# Patient Record
Sex: MALE | Race: WHITE | NOT HISPANIC OR LATINO | Employment: OTHER | URBAN - NONMETROPOLITAN AREA
[De-identification: names, ages, dates, MRNs, and addresses within clinical notes are randomized per-mention and may not be internally consistent; named-entity substitution may affect disease eponyms.]

---

## 2019-10-15 ENCOUNTER — TRANSCRIBE ORDERS (OUTPATIENT)
Dept: LAB | Facility: HOSPITAL | Age: 74
End: 2019-10-15

## 2019-10-15 ENCOUNTER — HOSPITAL ENCOUNTER (OUTPATIENT)
Dept: GENERAL RADIOLOGY | Facility: HOSPITAL | Age: 74
Discharge: HOME | End: 2019-10-15
Admitting: NURSE PRACTITIONER

## 2019-10-15 DIAGNOSIS — R05.9 COUGH: ICD-10-CM

## 2019-10-15 DIAGNOSIS — R05.9 COUGH: Primary | ICD-10-CM

## 2019-10-15 PROCEDURE — 71046 X-RAY EXAM CHEST 2 VIEWS: CPT

## 2019-10-15 PROCEDURE — 71046 X-RAY EXAM CHEST 2 VIEWS: CPT | Performed by: RADIOLOGY

## 2021-01-28 ENCOUNTER — TRANSCRIBE ORDERS (OUTPATIENT)
Dept: ADMINISTRATIVE | Facility: HOSPITAL | Age: 76
End: 2021-01-28

## 2021-01-28 DIAGNOSIS — Z01.818 PRE-OPERATIVE CLEARANCE: Primary | ICD-10-CM

## 2021-02-01 ENCOUNTER — LAB (OUTPATIENT)
Dept: LAB | Facility: HOSPITAL | Age: 76
End: 2021-02-01

## 2021-02-01 DIAGNOSIS — Z01.818 PRE-OPERATIVE CLEARANCE: ICD-10-CM

## 2021-02-01 LAB — SARS-COV-2 RNA RESP QL NAA+PROBE: NOT DETECTED

## 2021-02-01 PROCEDURE — U0004 COV-19 TEST NON-CDC HGH THRU: HCPCS

## 2021-02-01 PROCEDURE — C9803 HOPD COVID-19 SPEC COLLECT: HCPCS

## 2021-03-08 ENCOUNTER — IMMUNIZATION (OUTPATIENT)
Dept: VACCINE CLINIC | Facility: HOSPITAL | Age: 76
End: 2021-03-08

## 2021-03-08 PROCEDURE — 91300 HC SARSCOV02 VAC 30MCG/0.3ML IM: CPT | Performed by: INTERNAL MEDICINE

## 2021-03-08 PROCEDURE — 0001A: CPT | Performed by: INTERNAL MEDICINE

## 2021-03-29 ENCOUNTER — IMMUNIZATION (OUTPATIENT)
Dept: VACCINE CLINIC | Facility: HOSPITAL | Age: 76
End: 2021-03-29

## 2021-03-29 PROCEDURE — 91300 HC SARSCOV02 VAC 30MCG/0.3ML IM: CPT | Performed by: INTERNAL MEDICINE

## 2021-03-29 PROCEDURE — 0002A: CPT | Performed by: INTERNAL MEDICINE

## 2022-05-17 ENCOUNTER — HOSPITAL ENCOUNTER (EMERGENCY)
Dept: HOSPITAL 79 - ER1 | Age: 77
Discharge: HOME | End: 2022-05-17
Payer: MEDICARE

## 2022-05-17 DIAGNOSIS — S16.1XXA: ICD-10-CM
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
## 2022-05-17 DIAGNOSIS — R22.1: ICD-10-CM

## 2022-05-17 DIAGNOSIS — M50.31: ICD-10-CM

## 2022-05-17 DIAGNOSIS — S01.81XA: Primary | ICD-10-CM

## 2022-05-17 DIAGNOSIS — Z23: ICD-10-CM

## 2022-05-17 DIAGNOSIS — I10: ICD-10-CM

## 2022-05-17 DIAGNOSIS — Z85.46: ICD-10-CM
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
## 2022-06-08 ENCOUNTER — TRANSCRIBE ORDERS (OUTPATIENT)
Dept: LAB | Facility: HOSPITAL | Age: 77
End: 2022-06-08

## 2022-06-08 DIAGNOSIS — E04.9 ENLARGEMENT OF THYROID: Primary | ICD-10-CM

## 2022-06-09 ENCOUNTER — HOSPITAL ENCOUNTER (OUTPATIENT)
Dept: ULTRASOUND IMAGING | Facility: HOSPITAL | Age: 77
Discharge: HOME | End: 2022-06-09
Admitting: FAMILY MEDICINE

## 2022-06-09 DIAGNOSIS — E04.9 ENLARGEMENT OF THYROID: ICD-10-CM

## 2022-06-09 PROCEDURE — 76536 US EXAM OF HEAD AND NECK: CPT | Performed by: RADIOLOGY

## 2022-06-09 PROCEDURE — 76536 US EXAM OF HEAD AND NECK: CPT

## 2022-07-29 ENCOUNTER — HOSPITAL ENCOUNTER (OUTPATIENT)
Dept: ULTRASOUND IMAGING | Facility: HOSPITAL | Age: 77
Discharge: HOME | End: 2022-07-29
Admitting: SURGERY

## 2022-07-29 VITALS
OXYGEN SATURATION: 95 % | DIASTOLIC BLOOD PRESSURE: 63 MMHG | SYSTOLIC BLOOD PRESSURE: 111 MMHG | BODY MASS INDEX: 31.5 KG/M2 | RESPIRATION RATE: 18 BRPM | WEIGHT: 225 LBS | HEART RATE: 50 BPM | HEIGHT: 71 IN

## 2022-07-29 DIAGNOSIS — E04.1 THYROID NODULE: ICD-10-CM

## 2022-07-29 PROCEDURE — 88172 CYTP DX EVAL FNA 1ST EA SITE: CPT

## 2022-07-29 PROCEDURE — 76942 ECHO GUIDE FOR BIOPSY: CPT

## 2022-07-29 PROCEDURE — 88177 CYTP FNA EVAL EA ADDL: CPT

## 2022-07-29 PROCEDURE — 88173 CYTOPATH EVAL FNA REPORT: CPT

## 2022-07-29 PROCEDURE — 10005 FNA BX W/US GDN 1ST LES: CPT | Performed by: RADIOLOGY

## 2022-07-29 PROCEDURE — 88305 TISSUE EXAM BY PATHOLOGIST: CPT

## 2022-08-01 LAB — REF LAB TEST METHOD: NORMAL

## 2023-12-11 ENCOUNTER — TRANSCRIBE ORDERS (OUTPATIENT)
Dept: LAB | Facility: HOSPITAL | Age: 78
End: 2023-12-11
Payer: MEDICARE

## 2023-12-11 ENCOUNTER — HOSPITAL ENCOUNTER (OUTPATIENT)
Dept: GENERAL RADIOLOGY | Facility: HOSPITAL | Age: 78
Discharge: HOME | End: 2023-12-11
Admitting: FAMILY MEDICINE
Payer: MEDICARE

## 2023-12-11 DIAGNOSIS — M54.89 OTHER DORSALGIA: ICD-10-CM

## 2023-12-11 DIAGNOSIS — M54.89 OTHER DORSALGIA: Primary | ICD-10-CM

## 2023-12-11 PROCEDURE — 72110 X-RAY EXAM L-2 SPINE 4/>VWS: CPT

## 2025-03-28 ENCOUNTER — OFFICE VISIT (OUTPATIENT)
Dept: SURGERY | Facility: CLINIC | Age: 80
End: 2025-03-28
Payer: MEDICARE

## 2025-03-28 VITALS
HEIGHT: 71 IN | DIASTOLIC BLOOD PRESSURE: 60 MMHG | SYSTOLIC BLOOD PRESSURE: 128 MMHG | BODY MASS INDEX: 30.24 KG/M2 | WEIGHT: 216 LBS

## 2025-03-28 DIAGNOSIS — R13.10 DYSPHAGIA, UNSPECIFIED TYPE: Primary | ICD-10-CM

## 2025-03-28 RX ORDER — TIRZEPATIDE 7.5 MG/.5ML
INJECTION, SOLUTION SUBCUTANEOUS
COMMUNITY

## 2025-03-28 RX ORDER — LISINOPRIL 20 MG/1
TABLET ORAL
COMMUNITY

## 2025-03-28 RX ORDER — HYDROCHLOROTHIAZIDE 12.5 MG/1
12.5 TABLET ORAL DAILY
COMMUNITY

## 2025-03-28 NOTE — PROGRESS NOTES
Subjective   Gilberto Matthews is a 79 y.o. male who presents today for Initial Evaluation    Chief Complaint:    Chief Complaint   Patient presents with    EGD        History of Present Illness:    History of Present Illness Gilberto is an 79-year-old male who presents for evaluation for dysphagia.  Reports that this has been ongoing times the past 2 months.  He states that his symptoms are happening periodically.  However, it is worsening.  He has had an EGD in the past, he is unsure if he has ever had to have his esophagus dilated.  He denies any issues with vomiting.  He states that food feels like it gets stuck and he has to force himself to drink water.  He is currently on Mounjaro, he has his injections on Tuesdays.    The following portions of the patient's history were reviewed and updated as appropriate: allergies, current medications, past family history, past medical history, past social history, past surgical history and problem list.    Past Medical History:  Past Medical History:   Diagnosis Date    Cancer     prostate, skin    Hypertension        Social History:  Social History     Socioeconomic History    Marital status:    Tobacco Use    Smoking status: Never     Passive exposure: Never    Smokeless tobacco: Never   Vaping Use    Vaping status: Never Used   Substance and Sexual Activity    Alcohol use: Never    Drug use: Never    Sexual activity: Defer       Family History:  History reviewed. No pertinent family history.    Past Surgical History:  Past Surgical History:   Procedure Laterality Date    BACK SURGERY      PROSTATECTOMY      SHOULDER SURGERY         Problem List:  Patient Active Problem List   Diagnosis    Dysphagia       Allergy:   No Known Allergies     Current Medications:   Current Outpatient Medications   Medication Sig Dispense Refill    hydroCHLOROthiazide 12.5 MG tablet Take 1 tablet by mouth Daily.      lisinopril (PRINIVIL,ZESTRIL) 20 MG tablet       Mounjaro 7.5 MG/0.5ML  "solution auto-injector        No current facility-administered medications for this visit.       Review of Systems:    Review of Systems   HENT:  Positive for trouble swallowing.          Physical Exam:   Physical Exam  Constitutional:       Appearance: Normal appearance.   HENT:      Head: Normocephalic and atraumatic.      Right Ear: External ear normal.      Left Ear: External ear normal.   Eyes:      Conjunctiva/sclera: Conjunctivae normal.   Pulmonary:      Effort: Pulmonary effort is normal.   Abdominal:      General: Abdomen is flat.   Musculoskeletal:         General: Normal range of motion.      Cervical back: Normal range of motion and neck supple.   Skin:     General: Skin is warm and dry.      Capillary Refill: Capillary refill takes less than 2 seconds.   Neurological:      General: No focal deficit present.      Mental Status: He is alert and oriented to person, place, and time.   Psychiatric:         Mood and Affect: Mood normal.         Behavior: Behavior normal.         Vitals:  Blood pressure 128/60, height 180.3 cm (70.98\"), weight 98 kg (216 lb).   Body mass index is 30.14 kg/m².      Assessment & Plan   Diagnoses and all orders for this visit:    1. Dysphagia, unspecified type (Primary)  -     FL ESOPHAGRAM SINGLE CONTRAST; Future  -     Case Request; Standing  -     Case Request    Other orders  -     Follow Anesthesia Guidelines / Protocol; Future  -     Follow Anesthesia Guidelines / Protocol; Standing  -     Verify / Perform Chlorhexidine Skin Prep; Standing  -     Provide NPO Instructions to Patient; Future  -     Chlorhexidine Skin Prep; Future  -     Place Sequential Compression Device; Standing  -     Maintain Sequential Compression Device; Standing    Gilberto is an 79-year-old male who presents for evaluation for dysphagia.  He will undergo EGD with Dr. Richard.  I have ordered an esophagram.  Verbalized understanding of prep instructions and procedure and wishes to proceed.  We did discuss " being off Mounjaro for 2 weeks prior to procedure.  He will undergo procedures with Dr. Richard on 4/10/25.    Visit Diagnoses:    ICD-10-CM ICD-9-CM   1. Dysphagia, unspecified type  R13.10 787.20         MEDS ORDERED DURING VISIT:  No orders of the defined types were placed in this encounter.      No follow-ups on file.             This document has been electronically signed by KARMA Díaz  March 28, 2025 11:52 EDT    Please note that portions of this note were completed with a voice recognition program.

## 2025-04-10 ENCOUNTER — ANESTHESIA EVENT (OUTPATIENT)
Dept: PERIOP | Facility: HOSPITAL | Age: 80
End: 2025-04-10
Payer: MEDICARE

## 2025-04-10 ENCOUNTER — HOSPITAL ENCOUNTER (OUTPATIENT)
Facility: HOSPITAL | Age: 80
Setting detail: HOSPITAL OUTPATIENT SURGERY
Discharge: HOME | End: 2025-04-10
Attending: SURGERY | Admitting: SURGERY
Payer: MEDICARE

## 2025-04-10 ENCOUNTER — ANESTHESIA (OUTPATIENT)
Dept: PERIOP | Facility: HOSPITAL | Age: 80
End: 2025-04-10
Payer: MEDICARE

## 2025-04-10 VITALS
HEART RATE: 64 BPM | RESPIRATION RATE: 17 BRPM | SYSTOLIC BLOOD PRESSURE: 103 MMHG | WEIGHT: 210 LBS | TEMPERATURE: 97.4 F | HEIGHT: 71 IN | BODY MASS INDEX: 29.4 KG/M2 | OXYGEN SATURATION: 95 % | DIASTOLIC BLOOD PRESSURE: 65 MMHG

## 2025-04-10 DIAGNOSIS — R13.10 DYSPHAGIA, UNSPECIFIED TYPE: ICD-10-CM

## 2025-04-10 LAB — GLUCOSE BLDC GLUCOMTR-MCNC: 137 MG/DL (ref 70–130)

## 2025-04-10 PROCEDURE — 25810000003 LACTATED RINGERS PER 1000 ML: Performed by: ANESTHESIOLOGY

## 2025-04-10 PROCEDURE — C1769 GUIDE WIRE: HCPCS | Performed by: SURGERY

## 2025-04-10 PROCEDURE — 43248 EGD GUIDE WIRE INSERTION: CPT | Performed by: SURGERY

## 2025-04-10 PROCEDURE — 25010000002 LIDOCAINE PF 2% 2 % SOLUTION: Performed by: NURSE ANESTHETIST, CERTIFIED REGISTERED

## 2025-04-10 PROCEDURE — 43239 EGD BIOPSY SINGLE/MULTIPLE: CPT | Performed by: SURGERY

## 2025-04-10 PROCEDURE — 25010000002 PROPOFOL 200 MG/20ML EMULSION: Performed by: NURSE ANESTHETIST, CERTIFIED REGISTERED

## 2025-04-10 PROCEDURE — 82948 REAGENT STRIP/BLOOD GLUCOSE: CPT

## 2025-04-10 RX ORDER — SODIUM CHLORIDE 0.9 % (FLUSH) 0.9 %
10 SYRINGE (ML) INJECTION AS NEEDED
Status: DISCONTINUED | OUTPATIENT
Start: 2025-04-10 | End: 2025-04-10 | Stop reason: HOSPADM

## 2025-04-10 RX ORDER — SODIUM CHLORIDE, SODIUM LACTATE, POTASSIUM CHLORIDE, CALCIUM CHLORIDE 600; 310; 30; 20 MG/100ML; MG/100ML; MG/100ML; MG/100ML
125 INJECTION, SOLUTION INTRAVENOUS ONCE
Status: COMPLETED | OUTPATIENT
Start: 2025-04-10 | End: 2025-04-10

## 2025-04-10 RX ORDER — SODIUM CHLORIDE 0.9 % (FLUSH) 0.9 %
10 SYRINGE (ML) INJECTION EVERY 12 HOURS SCHEDULED
Status: DISCONTINUED | OUTPATIENT
Start: 2025-04-10 | End: 2025-04-10 | Stop reason: HOSPADM

## 2025-04-10 RX ORDER — OXYCODONE AND ACETAMINOPHEN 5; 325 MG/1; MG/1
1 TABLET ORAL ONCE AS NEEDED
Status: DISCONTINUED | OUTPATIENT
Start: 2025-04-10 | End: 2025-04-10 | Stop reason: HOSPADM

## 2025-04-10 RX ORDER — LIDOCAINE HYDROCHLORIDE 20 MG/ML
INJECTION, SOLUTION EPIDURAL; INFILTRATION; INTRACAUDAL; PERINEURAL AS NEEDED
Status: DISCONTINUED | OUTPATIENT
Start: 2025-04-10 | End: 2025-04-10 | Stop reason: SURG

## 2025-04-10 RX ORDER — PROPOFOL 10 MG/ML
INJECTION, EMULSION INTRAVENOUS AS NEEDED
Status: DISCONTINUED | OUTPATIENT
Start: 2025-04-10 | End: 2025-04-10 | Stop reason: SURG

## 2025-04-10 RX ORDER — IPRATROPIUM BROMIDE AND ALBUTEROL SULFATE 2.5; .5 MG/3ML; MG/3ML
3 SOLUTION RESPIRATORY (INHALATION) ONCE AS NEEDED
Status: DISCONTINUED | OUTPATIENT
Start: 2025-04-10 | End: 2025-04-10 | Stop reason: HOSPADM

## 2025-04-10 RX ORDER — EPHEDRINE SULFATE 5 MG/ML
INJECTION INTRAVENOUS AS NEEDED
Status: DISCONTINUED | OUTPATIENT
Start: 2025-04-10 | End: 2025-04-10 | Stop reason: SURG

## 2025-04-10 RX ORDER — SODIUM CHLORIDE 9 MG/ML
40 INJECTION, SOLUTION INTRAVENOUS AS NEEDED
Status: DISCONTINUED | OUTPATIENT
Start: 2025-04-10 | End: 2025-04-10 | Stop reason: HOSPADM

## 2025-04-10 RX ORDER — MIDAZOLAM HYDROCHLORIDE 1 MG/ML
0.5 INJECTION, SOLUTION INTRAMUSCULAR; INTRAVENOUS
Status: DISCONTINUED | OUTPATIENT
Start: 2025-04-10 | End: 2025-04-10 | Stop reason: HOSPADM

## 2025-04-10 RX ORDER — FENTANYL CITRATE 50 UG/ML
50 INJECTION, SOLUTION INTRAMUSCULAR; INTRAVENOUS
Status: DISCONTINUED | OUTPATIENT
Start: 2025-04-10 | End: 2025-04-10 | Stop reason: HOSPADM

## 2025-04-10 RX ORDER — ONDANSETRON 2 MG/ML
4 INJECTION INTRAMUSCULAR; INTRAVENOUS AS NEEDED
Status: DISCONTINUED | OUTPATIENT
Start: 2025-04-10 | End: 2025-04-10 | Stop reason: HOSPADM

## 2025-04-10 RX ADMIN — LIDOCAINE HYDROCHLORIDE 100 MG: 20 INJECTION, SOLUTION EPIDURAL; INFILTRATION; INTRACAUDAL; PERINEURAL at 07:32

## 2025-04-10 RX ADMIN — PROPOFOL 100 MG: 10 INJECTION, EMULSION INTRAVENOUS at 07:32

## 2025-04-10 RX ADMIN — SODIUM CHLORIDE, POTASSIUM CHLORIDE, SODIUM LACTATE AND CALCIUM CHLORIDE: 600; 310; 30; 20 INJECTION, SOLUTION INTRAVENOUS at 07:27

## 2025-04-10 RX ADMIN — EPHEDRINE SULFATE 15 MG: 5 INJECTION INTRAVENOUS at 07:37

## 2025-04-10 NOTE — ANESTHESIA POSTPROCEDURE EVALUATION
Patient: Gilberto Matthews    Procedure Summary       Date: 04/10/25 Room / Location: [facility] OR  /  [facility] OR    Anesthesia Start: 0727 Anesthesia Stop: 0741    Procedure: ESOPHAGOGASTRODUODENOSCOPY WITH ANESTHESIA (Esophagus) Diagnosis:       Dysphagia, unspecified type      (Dysphagia, unspecified type [R13.10])    Surgeons: Maynor Richard MD Provider: Gerry Berman DO    Anesthesia Type: general ASA Status: 2            Anesthesia Type: general    Vitals  Vitals Value Taken Time   /65 04/10/25 07:53   Temp     Pulse 71 04/10/25 07:58   Resp 20 04/10/25 07:58   SpO2 96 % 04/10/25 07:53           Post Anesthesia Care and Evaluation    Patient location during evaluation: bedside  Patient participation: complete - patient participated  Level of consciousness: awake and alert  Pain score: 1  Pain management: adequate    Airway patency: patent  Anesthetic complications: No anesthetic complications  PONV Status: none  Cardiovascular status: acceptable  Respiratory status: acceptable  Hydration status: acceptable

## 2025-04-10 NOTE — OP NOTE
ESOPHAGOGASTRODUODENOSCOPY WITH ANESTHESIA  Procedure Note    Gilberto Matthews  4/10/2025    Pre-op Diagnosis:   Dysphagia, unspecified type [R13.10]    Post-op Diagnosis: mild gastritis, spastic esophagus        Procedure(s):  ESOPHAGOGASTRODUODENOSCOPY WITH ANESTHESIA    Surgeon(s):  Maynor Richard MD    Anesthesia: General    Staff:   * No surgical staff found *    Estimated Blood Loss: none    Specimens:                Order Name Source Comment Collection Info Order Time   TISSUE EXAM, [facility] ([facility]) Gastric, Antrum  Collected By: Maynor Richard MD 4/10/2025  7:34 AM     Release to patient   Routine Release              Drains: * No LDAs found *    Procedure: The scope passed from the mouth to the duodenum. It was unremarkable. The gastric lining was mildly irritated. A biopsy was done in the antrum. The esophagus was spastic but no stricture or ulcer. A biopsy was done in the mid esophagus. The guide wire was placed down in to the stomach. The size 60 dilator passed and wire removed.     Findings:             Complications: none   Grafts / Implants N/A    Maynor Richard MD     Date: 4/10/2025  Time: 07:43 EDT

## 2025-04-10 NOTE — ANESTHESIA PREPROCEDURE EVALUATION
Anesthesia Evaluation     no history of anesthetic complications:   NPO Solid Status: > 8 hours  NPO Liquid Status: > 8 hours           Airway   Mallampati: II  TM distance: >3 FB  Neck ROM: full  No difficulty expected  Dental - normal exam     Pulmonary - normal exam   (+) COPD,  Cardiovascular - normal exam    (+) hypertension      Neuro/Psych  GI/Hepatic/Renal/Endo    (+) diabetes mellitus    Musculoskeletal     Abdominal  - normal exam   Substance History      OB/GYN          Other   arthritis,   history of cancer    ROS/Med Hx Other: Mounjaro last dose 3/25    Black Lung                Anesthesia Plan    ASA 2     general   total IV anesthesia  intravenous induction     Anesthetic plan, risks, benefits, and alternatives have been provided, discussed and informed consent has been obtained with: patient.  Pre-procedure education provided  Plan discussed with CRNA.      CODE STATUS:

## 2025-04-11 LAB — REF LAB TEST METHOD: NORMAL

## 2025-04-17 ENCOUNTER — OFFICE VISIT (OUTPATIENT)
Dept: SURGERY | Facility: CLINIC | Age: 80
End: 2025-04-17
Payer: MEDICARE

## 2025-04-17 VITALS — BODY MASS INDEX: 29.4 KG/M2 | WEIGHT: 210 LBS | HEIGHT: 71 IN

## 2025-04-17 DIAGNOSIS — R13.19 ESOPHAGEAL DYSPHAGIA: Primary | ICD-10-CM

## 2025-04-17 PROCEDURE — 1159F MED LIST DOCD IN RCRD: CPT | Performed by: SURGERY

## 2025-04-17 PROCEDURE — 99212 OFFICE O/P EST SF 10 MIN: CPT | Performed by: SURGERY

## 2025-04-17 PROCEDURE — 1160F RVW MEDS BY RX/DR IN RCRD: CPT | Performed by: SURGERY

## 2025-04-17 NOTE — PROGRESS NOTES
Subjective   Gilberto Matthews is a 80 y.o. male.     Chief Complaint: dysphagia    History of Present Illness He is a 81 yo who has had some dyphagia at times and had a EGD and dilation last week. He says he feels better now. He did have some mild irritation in the stomach and esophagus but no real stricture. No H pylori seen and biopsy of the esophagus was benign. He is on monjaro    The following portions of the patient's history were reviewed and updated as appropriate: current medications, past family history, past medical history, past social history, past surgical history and problem list.    Review of Systems    Objective   Physical Exam abdomen soft and not tender.    Past Medical History:   Diagnosis Date    Arthritis     Black lung disease     Cancer     prostate, skin    Diabetes mellitus     Hypertension        History reviewed. No pertinent family history.    Social History     Tobacco Use    Smoking status: Never     Passive exposure: Never    Smokeless tobacco: Never   Vaping Use    Vaping status: Never Used   Substance Use Topics    Alcohol use: Never    Drug use: Never       Past Surgical History:   Procedure Laterality Date    BACK SURGERY      COLONOSCOPY      ENDOSCOPY      ENDOSCOPY N/A 4/10/2025    Procedure: ESOPHAGOGASTRODUODENOSCOPY WITH ANESTHESIA;  Surgeon: Maynor Richard MD;  Location: [facility];  Service: Gastroenterology;  Laterality: N/A;  esophageal dilatation 20mm (60 FR)    LAPAROSCOPIC CHOLECYSTECTOMY      PROSTATECTOMY      SHOULDER SURGERY Bilateral        Current Outpatient Medications   Medication Instructions    hydroCHLOROthiazide 12.5 mg, Daily    lisinopril (PRINIVIL,ZESTRIL) 20 MG tablet     Mounjaro 7.5 MG/0.5ML solution auto-injector          Assessment & Plan   Diagnoses and all orders for this visit:    1. Esophageal dysphagia (Primary)    Return prn             This document has been electronically signed by Maynor Richard MD   April 17, 2025 16:08 EDT

## (undated) DEVICE — THE BITE BLOCK MAXI, LATEX FREE STRAP IS USED TO PROTECT THE ENDOSCOPE INSERTION TUBE FROM BEING BITTEN BY THE PATIENT.

## (undated) DEVICE — FRCP BX RADJAW4 NDL 2.8 240CM LG OG BX40

## (undated) DEVICE — CLEANGUIDE DISPOSABLE MARKED SPRING TIP GUIDEWIRE, 1.86 MM X 210 CM: Brand: CLEANGUIDE

## (undated) DEVICE — Device